# Patient Record
Sex: FEMALE | URBAN - METROPOLITAN AREA
[De-identification: names, ages, dates, MRNs, and addresses within clinical notes are randomized per-mention and may not be internally consistent; named-entity substitution may affect disease eponyms.]

---

## 2024-09-16 ENCOUNTER — CLAIMS CREATED FROM THE CLAIM WINDOW (OUTPATIENT)
Dept: URBAN - METROPOLITAN AREA MEDICAL CENTER 12 | Facility: MEDICAL CENTER | Age: 24
End: 2024-09-16
Payer: COMMERCIAL

## 2024-09-16 DIAGNOSIS — R74.01 ABNORMAL/ELEVATED TRANSAMINASE (SGOT, AMINOTRANSFERASE): ICD-10-CM

## 2024-09-16 DIAGNOSIS — R93.2 ABNORMAL FINDINGS ON DIAGNOSTIC IMAGING OF LIVER AND BILIARY TRACT: ICD-10-CM

## 2024-09-16 DIAGNOSIS — R74.8 ABNORMAL ALKALINE PHOSPHATASE TEST: ICD-10-CM

## 2024-09-16 DIAGNOSIS — R79.89 ABNORMAL BILIRUBIN TEST: ICD-10-CM

## 2024-09-16 PROCEDURE — G8427 DOCREV CUR MEDS BY ELIG CLIN: HCPCS | Performed by: STUDENT IN AN ORGANIZED HEALTH CARE EDUCATION/TRAINING PROGRAM

## 2024-09-16 PROCEDURE — 99223 1ST HOSP IP/OBS HIGH 75: CPT | Performed by: STUDENT IN AN ORGANIZED HEALTH CARE EDUCATION/TRAINING PROGRAM

## 2024-09-16 PROCEDURE — 99418 PROLNG IP/OBS E/M EA 15 MIN: CPT | Performed by: STUDENT IN AN ORGANIZED HEALTH CARE EDUCATION/TRAINING PROGRAM

## 2024-09-16 PROCEDURE — 99255 IP/OBS CONSLTJ NEW/EST HI 80: CPT | Performed by: STUDENT IN AN ORGANIZED HEALTH CARE EDUCATION/TRAINING PROGRAM

## 2024-09-17 ENCOUNTER — CLAIMS CREATED FROM THE CLAIM WINDOW (OUTPATIENT)
Dept: URBAN - METROPOLITAN AREA MEDICAL CENTER 12 | Facility: MEDICAL CENTER | Age: 24
End: 2024-09-17
Payer: COMMERCIAL

## 2024-09-17 DIAGNOSIS — R74.01 ABNORMAL/ELEVATED TRANSAMINASE (SGOT, AMINOTRANSFERASE): ICD-10-CM

## 2024-09-17 DIAGNOSIS — K76.89 HEPATIC CYST: ICD-10-CM

## 2024-09-17 DIAGNOSIS — R74.8 ABNORMAL ALKALINE PHOSPHATASE TEST: ICD-10-CM

## 2024-09-17 DIAGNOSIS — R79.89 ABNORMAL BILIRUBIN TEST: ICD-10-CM

## 2024-09-17 PROCEDURE — 99418 PROLNG IP/OBS E/M EA 15 MIN: CPT

## 2024-09-17 PROCEDURE — 99233 SBSQ HOSP IP/OBS HIGH 50: CPT

## 2024-09-18 ENCOUNTER — CLAIMS CREATED FROM THE CLAIM WINDOW (OUTPATIENT)
Dept: URBAN - METROPOLITAN AREA MEDICAL CENTER 12 | Facility: MEDICAL CENTER | Age: 24
End: 2024-09-18
Payer: COMMERCIAL

## 2024-09-18 DIAGNOSIS — R74.01 ABNORMAL/ELEVATED TRANSAMINASE (SGOT, AMINOTRANSFERASE): ICD-10-CM

## 2024-09-18 DIAGNOSIS — K76.89 HEPATIC CYST: ICD-10-CM

## 2024-09-18 DIAGNOSIS — R74.8 ABNORMAL ALKALINE PHOSPHATASE TEST: ICD-10-CM

## 2024-09-18 DIAGNOSIS — R79.89 ABNORMAL BILIRUBIN TEST: ICD-10-CM

## 2024-09-18 PROCEDURE — 99233 SBSQ HOSP IP/OBS HIGH 50: CPT

## 2024-09-18 PROCEDURE — 99418 PROLNG IP/OBS E/M EA 15 MIN: CPT

## 2024-09-19 ENCOUNTER — CLAIMS CREATED FROM THE CLAIM WINDOW (OUTPATIENT)
Dept: URBAN - METROPOLITAN AREA MEDICAL CENTER 12 | Facility: MEDICAL CENTER | Age: 24
End: 2024-09-19
Payer: COMMERCIAL

## 2024-09-19 DIAGNOSIS — R74.8 ABNORMAL ALKALINE PHOSPHATASE TEST: ICD-10-CM

## 2024-09-19 DIAGNOSIS — R79.89 ABNORMAL BILIRUBIN TEST: ICD-10-CM

## 2024-09-19 DIAGNOSIS — K76.89 HEPATIC CYST: ICD-10-CM

## 2024-09-19 DIAGNOSIS — R74.01 ABNORMAL/ELEVATED TRANSAMINASE (SGOT, AMINOTRANSFERASE): ICD-10-CM

## 2024-09-19 PROCEDURE — 99233 SBSQ HOSP IP/OBS HIGH 50: CPT

## 2024-09-19 PROCEDURE — 99418 PROLNG IP/OBS E/M EA 15 MIN: CPT

## 2024-09-20 ENCOUNTER — CLAIMS CREATED FROM THE CLAIM WINDOW (OUTPATIENT)
Dept: URBAN - METROPOLITAN AREA MEDICAL CENTER 12 | Facility: MEDICAL CENTER | Age: 24
End: 2024-09-20
Payer: COMMERCIAL

## 2024-09-20 DIAGNOSIS — K76.89 HEPATIC CYST: ICD-10-CM

## 2024-09-20 DIAGNOSIS — R74.8 ABNORMAL ALKALINE PHOSPHATASE TEST: ICD-10-CM

## 2024-09-20 DIAGNOSIS — R74.01 ABNORMAL/ELEVATED TRANSAMINASE (SGOT, AMINOTRANSFERASE): ICD-10-CM

## 2024-09-20 DIAGNOSIS — R79.89 ABNORMAL BILIRUBIN TEST: ICD-10-CM

## 2024-09-20 PROCEDURE — 99233 SBSQ HOSP IP/OBS HIGH 50: CPT

## 2024-09-20 PROCEDURE — 99418 PROLNG IP/OBS E/M EA 15 MIN: CPT

## 2024-09-21 ENCOUNTER — CLAIMS CREATED FROM THE CLAIM WINDOW (OUTPATIENT)
Dept: URBAN - METROPOLITAN AREA MEDICAL CENTER 12 | Facility: MEDICAL CENTER | Age: 24
End: 2024-09-21

## 2024-09-21 PROCEDURE — 99232 SBSQ HOSP IP/OBS MODERATE 35: CPT | Performed by: INTERNAL MEDICINE

## 2024-09-22 ENCOUNTER — CLAIMS CREATED FROM THE CLAIM WINDOW (OUTPATIENT)
Dept: URBAN - METROPOLITAN AREA MEDICAL CENTER 12 | Facility: MEDICAL CENTER | Age: 24
End: 2024-09-22

## 2024-09-22 PROCEDURE — 99231 SBSQ HOSP IP/OBS SF/LOW 25: CPT | Performed by: INTERNAL MEDICINE

## 2024-09-23 ENCOUNTER — CLAIMS CREATED FROM THE CLAIM WINDOW (OUTPATIENT)
Dept: URBAN - METROPOLITAN AREA MEDICAL CENTER 12 | Facility: MEDICAL CENTER | Age: 24
End: 2024-09-23

## 2024-09-23 PROCEDURE — 99233 SBSQ HOSP IP/OBS HIGH 50: CPT

## 2024-09-24 ENCOUNTER — CLAIMS CREATED FROM THE CLAIM WINDOW (OUTPATIENT)
Dept: URBAN - METROPOLITAN AREA MEDICAL CENTER 12 | Facility: MEDICAL CENTER | Age: 24
End: 2024-09-24

## 2024-09-24 PROCEDURE — 99233 SBSQ HOSP IP/OBS HIGH 50: CPT

## 2024-09-25 ENCOUNTER — CLAIMS CREATED FROM THE CLAIM WINDOW (OUTPATIENT)
Dept: URBAN - METROPOLITAN AREA MEDICAL CENTER 12 | Facility: MEDICAL CENTER | Age: 24
End: 2024-09-25

## 2024-09-25 PROCEDURE — 99233 SBSQ HOSP IP/OBS HIGH 50: CPT

## 2024-09-26 ENCOUNTER — CLAIMS CREATED FROM THE CLAIM WINDOW (OUTPATIENT)
Dept: URBAN - METROPOLITAN AREA MEDICAL CENTER 12 | Facility: MEDICAL CENTER | Age: 24
End: 2024-09-26

## 2024-09-26 PROCEDURE — 99233 SBSQ HOSP IP/OBS HIGH 50: CPT

## 2024-09-27 ENCOUNTER — CLAIMS CREATED FROM THE CLAIM WINDOW (OUTPATIENT)
Dept: URBAN - METROPOLITAN AREA MEDICAL CENTER 12 | Facility: MEDICAL CENTER | Age: 24
End: 2024-09-27

## 2024-09-27 PROCEDURE — 99233 SBSQ HOSP IP/OBS HIGH 50: CPT

## 2024-09-28 ENCOUNTER — P2P PATIENT RECORD (OUTPATIENT)
Age: 24
End: 2024-09-28

## 2024-10-07 ENCOUNTER — DASHBOARD ENCOUNTERS (OUTPATIENT)
Age: 24
End: 2024-10-07

## 2024-10-07 ENCOUNTER — OFFICE VISIT (OUTPATIENT)
Dept: URBAN - METROPOLITAN AREA CLINIC 86 | Facility: CLINIC | Age: 24
End: 2024-10-07
Payer: COMMERCIAL

## 2024-10-07 ENCOUNTER — TELEPHONE ENCOUNTER (OUTPATIENT)
Dept: URBAN - METROPOLITAN AREA CLINIC 86 | Facility: CLINIC | Age: 24
End: 2024-10-07

## 2024-10-07 VITALS
DIASTOLIC BLOOD PRESSURE: 65 MMHG | WEIGHT: 99 LBS | HEIGHT: 62 IN | TEMPERATURE: 97.9 F | BODY MASS INDEX: 18.22 KG/M2 | HEART RATE: 81 BPM | SYSTOLIC BLOOD PRESSURE: 100 MMHG

## 2024-10-07 DIAGNOSIS — Z79.899 HIGH RISK MEDICATION USE: ICD-10-CM

## 2024-10-07 DIAGNOSIS — R74.8 ELEVATED LIVER ENZYMES: ICD-10-CM

## 2024-10-07 DIAGNOSIS — R93.5 ABNORMAL ABDOMINAL MRI: ICD-10-CM

## 2024-10-07 DIAGNOSIS — Z71.85 VACCINE COUNSELING: ICD-10-CM

## 2024-10-07 DIAGNOSIS — K75.4 AUTOIMMUNE HEPATITIS: ICD-10-CM

## 2024-10-07 PROBLEM — 408335007: Status: ACTIVE | Noted: 2024-10-07

## 2024-10-07 PROCEDURE — 99205 OFFICE O/P NEW HI 60 MIN: CPT | Performed by: PHYSICIAN ASSISTANT

## 2024-10-07 RX ORDER — URSODIOL 300 MG/1
1 CAPSULE CAPSULE ORAL TWICE A DAY
Status: ACTIVE | COMMUNITY

## 2024-10-07 RX ORDER — PREDNISONE 10 MG/1
TAKE 4 TABS PO X 1 WEEK, TAKE 3 TABS PO X 1 WEEK, TAKE 2 TABS PO X 1 WEEK, TAKE 1 TAB PO X 1 WEEK TABLET ORAL ONCE A DAY
Status: ACTIVE | COMMUNITY

## 2024-10-07 NOTE — HPI-TODAY'S VISIT:
This is a 23-year-old female, who was recently seen in the hospital with Dr. Mejia who was treated for autoimmune hepatitis.  Prior to her admission her past medical hx was unremarkable.  She had presented to the hospital with nausea and vomiting for a week and noted that she had a baby about 4 months ago.  She was told by her mother that her eyes were becoming yellow and so she went to the hospital.  She was taking an herbal remedy called Apetamin and it does have reports of causing autoimmune hepatitis and while in the hospital her liver enzymes were elevated and her ASMA was elevated over 100 and therefore biopsy was done and before discharge she was preliminary diagnosed with AIH.  She had an MRI in the hospital showing a hepatic cyst and gradual delayed periportal enhancement and mild periportal edema consistent with hepatitis.  They also noted some enlarged lymph nodes and recommended follow-up. She was started on medrol and ursodiol in the hospital.   discharge labs 9/30/24 sodium 136, potassium 4.0, creatinine 0.47, bilirubin 2.7, alkaline phosphatase 108,  .  September 15 labs with bilirubin 6.9, alkaline phosphatase 154, AST 1249, ALT 1197.  Pathologic Diagnosis  A. Liver, native, biopsy:  Hepatic parenchyma with autoimmune hepatitis pattern.  See comment.  Signout location: [facility]  Electronically signed by Andrew Flowers MD on 9/30/2024 at 1018  Comment   Sections show liver parenchyma with portal tract expanded by inflammatory infiltrate predominantly formed of lymphocytes and plasma cells with associated interface hepatitis. The lobules show mild inflammatory activity.  The Trichrome shows no significant fibrosis.  The iron stain shows no significant iron deposition within hepatocytes and/or Kupffer cells. No  PAS+ intracytoplasmic globules are noted on the PAS-D stain.  Overall morphology is compatible with autoimmune hepatitis pattern.  The differential diagnosis include de judith autoimmune hepatitis versus secondary etiology such as drug-induced, involvement by autoimmune disease (SLE), among others.  Correlation with complete clinical information including virologic and serologic testing,  and medication use is needed.  Reviewed the above and need to repeat mri in 3 months and this would be december.

## 2024-10-07 NOTE — HPI-TODAY'S VISIT:
To whom it may concern,   Please excuse this patient from work as she had an appointment with us today. If questions please call [phone]  Meseret Geronmio PA-C

## 2024-10-08 ENCOUNTER — TELEPHONE ENCOUNTER (OUTPATIENT)
Dept: URBAN - METROPOLITAN AREA CLINIC 86 | Facility: CLINIC | Age: 24
End: 2024-10-08

## 2024-10-08 NOTE — HPI-TODAY'S VISIT:
Work excuse:  Re: Stephanie Adams        2000  To whom it may concern:   Please note that Ms. Adams was in the hospital from Sept 15, 2024,  through September 28, 2024.  I saw her as a consultant during that time and she had been under the care of her [facility] attending. Dr. Jozef Holder from the [facility] medicine team for overall care.  Please contact our office if you need additional information.  Dr. Mejia

## 2024-10-10 ENCOUNTER — TELEPHONE ENCOUNTER (OUTPATIENT)
Dept: URBAN - METROPOLITAN AREA CLINIC 86 | Facility: CLINIC | Age: 24
End: 2024-10-10

## 2024-10-10 ENCOUNTER — TELEPHONE ENCOUNTER (OUTPATIENT)
Dept: URBAN - METROPOLITAN AREA CLINIC 18 | Facility: CLINIC | Age: 24
End: 2024-10-10

## 2024-10-10 NOTE — HPI-TODAY'S VISIT:
To whom it may concern,   Please allow this patient to return to work and please noted that this patient was hospitalized at [facility] from 9/15/24-9/28/24. Please excuse her time away from work due to her illness.   Meseret Geronimo PA-C

## 2024-10-10 NOTE — HPI-TODAY'S VISIT:
To whom it may concern,   This patient is cleared to return to work. Please excuse her time away. If there are questions call [phone] extension [phone].   Meseret Geronimo PA-C

## 2024-10-10 NOTE — HPI-TODAY'S VISIT:
Dear Stephanie Adams,  The recent labs were sent to me.  These were dated October 7.  Happy to see these are lower.  Sodium 137, potassium 3.6, creatinine 0.44, bilirubin 1.8, alkaline phosphatase 104, AST 46 and .  The white blood cells 5.2, red blood cell 3.86, hemoglobin 1.7 and platelets 235.  Previously on the 30th the bilirubin was 2.7, alkaline phosphatase 108,  and .  The ladies are improving. Happy to see this. Meseret Geronimo PA-C

## 2024-10-21 ENCOUNTER — LAB OUTSIDE AN ENCOUNTER (OUTPATIENT)
Dept: URBAN - METROPOLITAN AREA CLINIC 86 | Facility: CLINIC | Age: 24
End: 2024-10-21

## 2024-10-28 ENCOUNTER — TELEPHONE ENCOUNTER (OUTPATIENT)
Dept: URBAN - METROPOLITAN AREA CLINIC 86 | Facility: CLINIC | Age: 24
End: 2024-10-28

## 2024-10-28 NOTE — HPI-TODAY'S VISIT:
Stephanie Adams,  The recent labs were sent to me.  0.56, sodium 137, albumin 3.8, alkaline phosphatase 101, AST 55, ALT 45.  Previously the AST was 46 and the ALT was 116 and these continue to improve.  Happy to see this.  Bilirubin was normal at 0.9.  White blood cells 4.0, hemoglobin 12.2 and platelets normal at 177.  Will review at the follow-up.  Meseret Geronimo PA-C

## 2024-11-04 ENCOUNTER — LAB OUTSIDE AN ENCOUNTER (OUTPATIENT)
Dept: URBAN - METROPOLITAN AREA CLINIC 86 | Facility: CLINIC | Age: 24
End: 2024-11-04

## 2024-11-13 ENCOUNTER — OFFICE VISIT (OUTPATIENT)
Dept: URBAN - METROPOLITAN AREA CLINIC 86 | Facility: CLINIC | Age: 24
End: 2024-11-13
Payer: COMMERCIAL

## 2024-11-13 VITALS
DIASTOLIC BLOOD PRESSURE: 64 MMHG | BODY MASS INDEX: 18.77 KG/M2 | RESPIRATION RATE: 99 BRPM | SYSTOLIC BLOOD PRESSURE: 87 MMHG | WEIGHT: 102 LBS | HEART RATE: 66 BPM | TEMPERATURE: 96.4 F | HEIGHT: 62 IN

## 2024-11-13 DIAGNOSIS — Z71.85 VACCINE COUNSELING: ICD-10-CM

## 2024-11-13 DIAGNOSIS — R74.8 ELEVATED LIVER ENZYMES: ICD-10-CM

## 2024-11-13 DIAGNOSIS — R93.5 ABNORMAL ABDOMINAL MRI: ICD-10-CM

## 2024-11-13 DIAGNOSIS — K75.4 AUTOIMMUNE HEPATITIS: ICD-10-CM

## 2024-11-13 DIAGNOSIS — Z79.899 HIGH RISK MEDICATION USE: ICD-10-CM

## 2024-11-13 PROBLEM — 416200006: Status: ACTIVE | Noted: 2024-11-13

## 2024-11-13 PROBLEM — 443913008: Status: ACTIVE | Noted: 2024-11-13

## 2024-11-13 PROBLEM — 707724006: Status: ACTIVE | Noted: 2024-11-13

## 2024-11-13 PROBLEM — 453861000124107: Status: ACTIVE | Noted: 2024-11-13

## 2024-11-13 PROCEDURE — 99214 OFFICE O/P EST MOD 30 MIN: CPT | Performed by: PHYSICIAN ASSISTANT

## 2024-11-13 RX ORDER — URSODIOL 300 MG/1
1 CAPSULE CAPSULE ORAL TWICE A DAY
Status: ACTIVE | COMMUNITY

## 2024-11-13 RX ORDER — PREDNISONE 10 MG/1
TAKE 4 TABS PO X 1 WEEK, TAKE 3 TABS PO X 1 WEEK, TAKE 2 TABS PO X 1 WEEK, TAKE 1 TAB PO X 1 WEEK TABLET ORAL ONCE A DAY
Status: ACTIVE | COMMUNITY

## 2024-11-13 NOTE — HPI-TODAY'S VISIT:
This is a 23-year-old female, who was recently seen in the hospital with Dr. Mejia who was treated for autoimmune hepatitis.  Prior to her admission her past medical hx was unremarkable.  11/13/24   The recent labs were sent to me. 0.56, sodium 137, albumin 3.8, alkaline phosphatase 101, AST 55, ALT 45. Previously the AST was 46 and the ALT was 116 and these continue to improve. Happy to see this. Bilirubin was normal at 0.9. White blood cells 4.0, hemoglobin 12.2 and platelets normal at 177. Will review at the follow-up.   10/7/24 labs. Happy to see these are lower. Sodium 137, potassium 3.6, creatinine 0.44, bilirubin 1.8, alkaline phosphatase 104, AST 46 and . The white blood cells 5.2, red blood cell 3.86, hemoglobin 1.7 and platelets 235. Previously on the 30th the bilirubin was 2.7, alkaline phosphatase 108,  and . The ladies are improving.  Happy to see this.  she said she was confused and ran out of [facility] and did not let us know she is moving in dec to [place], gave local MD info needs to schedule _update labs now [place] depending that restart vs add aza she needs MRI in dec locally when she moves rec seeing oltx at [facility] when there after establishing with gi as no appt yet and needs one and likely seen sooner that way.    recap She had presented to the hospital with nausea and vomiting for a week and noted that she had a baby about 4 months ago.  She was told by her mother that her eyes were becoming yellow and so she went to the hospital.  She was taking an herbal remedy called Apetamin and it does have reports of causing autoimmune hepatitis and while in the hospital her liver enzymes were elevated and her ASMA was elevated over 100 and therefore biopsy was done and before discharge she was preliminary diagnosed with AIH.  She had an MRI in the hospital showing a hepatic cyst and gradual delayed periportal enhancement and mild periportal edema consistent with hepatitis.  They also noted some enlarged lymph nodes and recommended follow-up. She was started on medrol and ursodiol in the hospital.   discharge labs 9/30/24 sodium 136, potassium 4.0, creatinine 0.47, bilirubin 2.7, alkaline phosphatase 108,  .  September 15 labs with bilirubin 6.9, alkaline phosphatase 154, AST 1249, ALT 1197.  Pathologic Diagnosis  A. Liver, native, biopsy:  Hepatic parenchyma with autoimmune hepatitis pattern.  See comment.  Signout location: [facility]  Electronically signed by Andrew Flowers MD on 9/30/2024 at 1018  Comment   Sections show liver parenchyma with portal tract expanded by inflammatory infiltrate predominantly formed of lymphocytes and plasma cells with associated interface hepatitis. The lobules show mild inflammatory activity.  The Trichrome shows no significant fibrosis.  The iron stain shows no significant iron deposition within hepatocytes and/or Kupffer cells. No  PAS+ intracytoplasmic globules are noted on the PAS-D stain.  Overall morphology is compatible with autoimmune hepatitis pattern.  The differential diagnosis include de judith autoimmune hepatitis versus secondary etiology such as drug-induced, involvement by autoimmune disease (SLE), among others.  Correlation with complete clinical information including virologic and serologic testing,  and medication use is needed.  Reviewed the above and need to repeat mri in 3 months and this would be december.

## 2024-11-14 ENCOUNTER — TELEPHONE ENCOUNTER (OUTPATIENT)
Dept: URBAN - METROPOLITAN AREA CLINIC 86 | Facility: CLINIC | Age: 24
End: 2024-11-14

## 2024-11-14 RX ORDER — METHYLPREDNISOLONE 4 MG/1
5 TABLETS WITH FOOD OR MILK TABLET ORAL DAILY
Qty: 150 TABLET | Refills: 0 | OUTPATIENT
Start: 2024-11-14 | End: 2024-12-14

## 2024-11-19 ENCOUNTER — TELEPHONE ENCOUNTER (OUTPATIENT)
Dept: URBAN - METROPOLITAN AREA CLINIC 18 | Facility: CLINIC | Age: 24
End: 2024-11-19

## 2024-11-19 RX ORDER — METHYLPREDNISOLONE 8 MG/1
2.5 TABLET DAILY WITH FOOD OR MILK TABLET ORAL DAILY
Qty: 75 TABLET | Refills: 0 | OUTPATIENT
Start: 2024-11-20 | End: 2024-12-20

## 2024-11-20 ENCOUNTER — LAB OUTSIDE AN ENCOUNTER (OUTPATIENT)
Dept: URBAN - METROPOLITAN AREA CLINIC 86 | Facility: CLINIC | Age: 24
End: 2024-11-20

## 2024-11-22 ENCOUNTER — TELEPHONE ENCOUNTER (OUTPATIENT)
Dept: URBAN - METROPOLITAN AREA CLINIC 86 | Facility: CLINIC | Age: 24
End: 2024-11-22

## 2024-11-22 NOTE — HPI-TODAY'S VISIT:
Ms. Adams, The recent labs were sent to us and as expected they were higher.  The bilirubin up to 1.1 and previously 0.8, alkaline phosphatase 60 and  and .  Previously AST 97 and .  Please let us know if you did not  the steroids. Meseret Geronimo PA-C

## 2024-12-02 ENCOUNTER — LAB OUTSIDE AN ENCOUNTER (OUTPATIENT)
Dept: URBAN - METROPOLITAN AREA CLINIC 86 | Facility: CLINIC | Age: 24
End: 2024-12-02